# Patient Record
Sex: FEMALE | Race: ASIAN | NOT HISPANIC OR LATINO
[De-identification: names, ages, dates, MRNs, and addresses within clinical notes are randomized per-mention and may not be internally consistent; named-entity substitution may affect disease eponyms.]

---

## 2017-01-23 ENCOUNTER — NON-APPOINTMENT (OUTPATIENT)
Age: 57
End: 2017-01-23

## 2017-01-23 ENCOUNTER — APPOINTMENT (OUTPATIENT)
Dept: CARDIOLOGY | Facility: CLINIC | Age: 57
End: 2017-01-23

## 2017-01-23 VITALS — SYSTOLIC BLOOD PRESSURE: 108 MMHG | DIASTOLIC BLOOD PRESSURE: 66 MMHG

## 2017-01-23 VITALS
TEMPERATURE: 98 F | DIASTOLIC BLOOD PRESSURE: 71 MMHG | OXYGEN SATURATION: 98 % | HEART RATE: 77 BPM | SYSTOLIC BLOOD PRESSURE: 113 MMHG | BODY MASS INDEX: 21.87 KG/M2 | RESPIRATION RATE: 17 BRPM | WEIGHT: 112 LBS

## 2017-01-23 RX ORDER — ALBUTEROL SULFATE 2.5 MG/3ML
(2.5 MG/3ML) SOLUTION RESPIRATORY (INHALATION)
Qty: 150 | Refills: 0 | Status: ACTIVE | COMMUNITY
Start: 2017-01-12

## 2017-01-23 RX ORDER — ALBUTEROL SULFATE 90 UG/1
108 (90 BASE) AEROSOL, METERED RESPIRATORY (INHALATION)
Qty: 8 | Refills: 0 | Status: ACTIVE | COMMUNITY
Start: 2017-01-12

## 2018-01-29 ENCOUNTER — NON-APPOINTMENT (OUTPATIENT)
Age: 58
End: 2018-01-29

## 2018-01-29 ENCOUNTER — APPOINTMENT (OUTPATIENT)
Dept: CARDIOLOGY | Facility: CLINIC | Age: 58
End: 2018-01-29
Payer: COMMERCIAL

## 2018-01-29 VITALS
OXYGEN SATURATION: 98 % | DIASTOLIC BLOOD PRESSURE: 77 MMHG | BODY MASS INDEX: 22.85 KG/M2 | HEART RATE: 74 BPM | RESPIRATION RATE: 18 BRPM | SYSTOLIC BLOOD PRESSURE: 118 MMHG | TEMPERATURE: 98 F | WEIGHT: 117 LBS

## 2018-01-29 PROCEDURE — 93000 ELECTROCARDIOGRAM COMPLETE: CPT | Mod: 59

## 2018-01-29 PROCEDURE — 99214 OFFICE O/P EST MOD 30 MIN: CPT

## 2019-07-31 ENCOUNTER — NON-APPOINTMENT (OUTPATIENT)
Age: 59
End: 2019-07-31

## 2019-07-31 ENCOUNTER — APPOINTMENT (OUTPATIENT)
Dept: CARDIOLOGY | Facility: CLINIC | Age: 59
End: 2019-07-31
Payer: COMMERCIAL

## 2019-07-31 VITALS
SYSTOLIC BLOOD PRESSURE: 112 MMHG | RESPIRATION RATE: 18 BRPM | DIASTOLIC BLOOD PRESSURE: 73 MMHG | HEART RATE: 65 BPM | WEIGHT: 114 LBS | OXYGEN SATURATION: 98 % | BODY MASS INDEX: 22.26 KG/M2

## 2019-07-31 PROCEDURE — 93351 STRESS TTE COMPLETE: CPT

## 2019-07-31 PROCEDURE — 93325 DOPPLER ECHO COLOR FLOW MAPG: CPT

## 2019-07-31 PROCEDURE — 99214 OFFICE O/P EST MOD 30 MIN: CPT

## 2019-07-31 PROCEDURE — 93320 DOPPLER ECHO COMPLETE: CPT

## 2019-07-31 PROCEDURE — 93000 ELECTROCARDIOGRAM COMPLETE: CPT | Mod: 59

## 2019-07-31 NOTE — REASON FOR VISIT
[Follow-Up - Clinic] : a clinic follow-up of [Abnormal Test Result] : an abnormal test result [Chest Pain] : chest pain [Hyperlipidemia] : hyperlipidemia [Hypertension] : hypertension

## 2019-07-31 NOTE — DISCUSSION/SUMMARY
[Coronary Artery Catheterization] : coronary artery catheterization [Stent Placement] : stent placement [Dietary Modification] : dietary modification [Hyperlipidemia] : hyperlipidemia [Known CAD] : known coronary artery disease [Diet Modification] : diet modification [Exercise] : exercise [Hypertension] : hypertension [Stable] : stable [Responding to Treatment] : responding to treatment [None] : none [Exercise Regimen] : an exercise regimen [Sodium Restriction] : sodium restriction [Patient] : the patient [Risks] : risks [Benefits] : benefits [Alternatives] : alternatives [Minutes spent___] : for [unfilled] ~Uminutes [___ Month(s)] : [unfilled] month(s) [With Me] : with me [Stress Echocardiogram] : stress echocardiogram [de-identified] : s/p PCI, resolved, recurrent again in sequence [de-identified] : s/p PCI, asymptomatic. [de-identified] :  no ASA but clopeggrel dueto stomach issue. [de-identified] : PCP is moderating the  effect of anti-lipid therapy.

## 2019-07-31 NOTE — REVIEW OF SYSTEMS
[Fever] : no fever [Headache] : no headache [Chills] : no chills [Feeling Fatigued] : not feeling fatigued [Shortness Of Breath] : no shortness of breath [Chest  Pressure] : no chest pressure [Dyspnea on exertion] : not dyspnea during exertion [Chest Pain] : chest pain [Lower Ext Edema] : no extremity edema [Leg Claudication] : no intermittent leg claudication [Palpitations] : no palpitations [Cough] : no cough [Wheezing] : no wheezing [Coughing Up Blood] : no hemoptysis [Abdominal Pain] : no abdominal pain [Nausea] : no nausea [Vomiting] : no vomiting [Heartburn] : no heartburn [Change in Appetite] : no change in appetite [Change In The Stool] : no change in stool [Dysphagia] : no dysphagia [Joint Pain] : no joint pain [Joint Swelling] : no joint swelling [Joint Stiffness] : no joint stiffness [Muscle Cramps] : no muscle cramps [Limb Weakness (Paresis)] : no limb weakness [Skin: A Rash] : no rash: [Itching] : no itching [Change In Color Of Skin] : change in skin color [Skin Lesions] : no skin lesions [Dizziness] : no dizziness [Tremor] : no tremor was seen [Numbness (Hypesthesia)] : no numbness [Convulsions] : no convulsions [Tingling (Paresthesia)] : no tingling [Confusion] : no confusion was observed [Memory Lapses Or Loss] : no memory lapses or loss [Anxiety] : no anxiety [Depression] : no depression [Suicidal] : not suicidal [Under Stress] : not under stress [Easy Bleeding] : no tendency for easy bleeding [Excessive Thirst] : no polydipsia [Swollen Glands] : no swollen glands [Easy Bruising] : no tendency for easy bruising [Swollen Glands In The Neck] : no swollen glands in the neck [Negative] : Genitourinary

## 2019-07-31 NOTE — HISTORY OF PRESENT ILLNESS
[FreeTextEntry1] : On 9-, the patient has dobutamine stress  nuclear test, from which, she was found with  large area of anterior wall ischemia.\par On 9-, she tolerated the PCI  for  PLAD and MLAD. She is working now without symptoms.\par Since the completion of the PCI, she has been doing well without chest pain, shortness of breath, dizziness or palpitations until  last week with episode of chest pain while at walking, located at the right upper chest, but without shortness of breath or other symptoms.\par

## 2020-03-09 ENCOUNTER — NON-APPOINTMENT (OUTPATIENT)
Age: 60
End: 2020-03-09

## 2020-03-09 ENCOUNTER — APPOINTMENT (OUTPATIENT)
Dept: CARDIOLOGY | Facility: CLINIC | Age: 60
End: 2020-03-09
Payer: COMMERCIAL

## 2020-03-09 VITALS
RESPIRATION RATE: 18 BRPM | HEART RATE: 79 BPM | BODY MASS INDEX: 21.68 KG/M2 | SYSTOLIC BLOOD PRESSURE: 113 MMHG | DIASTOLIC BLOOD PRESSURE: 73 MMHG | WEIGHT: 111 LBS | TEMPERATURE: 98.3 F | OXYGEN SATURATION: 97 %

## 2020-03-09 PROCEDURE — 93000 ELECTROCARDIOGRAM COMPLETE: CPT | Mod: 59

## 2020-03-09 PROCEDURE — 99214 OFFICE O/P EST MOD 30 MIN: CPT

## 2020-03-09 NOTE — HISTORY OF PRESENT ILLNESS
[FreeTextEntry1] : On 9-, the patient has dobutamine stress  nuclear test, from which, she was found with  large area of anterior wall ischemia.\par On 9-, she tolerated the PCI  for  PLAD and MLAD. She is working now without symptoms.\par Since the completion of the PCI, she has been doing well without chest pain, shortness of breath, dizziness or palpitations. \par

## 2020-03-09 NOTE — DISCUSSION/SUMMARY
[Stress Echocardiogram] : stress echocardiogram [Coronary Artery Catheterization] : coronary artery catheterization [Stent Placement] : stent placement [Dietary Modification] : dietary modification [Hyperlipidemia] : hyperlipidemia [Known CAD] : known coronary artery disease [Diet Modification] : diet modification [Exercise] : exercise [Hypertension] : hypertension [Stable] : stable [Responding to Treatment] : responding to treatment [None] : none [Exercise Regimen] : an exercise regimen [Sodium Restriction] : sodium restriction [Patient] : the patient [Risks] : risks [Benefits] : benefits [Alternatives] : alternatives [Minutes spent___] : for [unfilled] ~Uminutes [___ Month(s)] : [unfilled] month(s) [With Me] : with me [Family] : the patient's family [de-identified] : s/p PCI, resolved, recurrent again in sequence [de-identified] : s/p PCI, asymptomatic. [de-identified] :  no ASA but clopeggrel dueto stomach issue. [de-identified] : PCP is moderating the  effect of anti-lipid therapy. The ideal LDL level shall be less than 70, Zetia may have to used.

## 2020-03-09 NOTE — PHYSICAL EXAM
[General Appearance - Well Developed] : well developed [Normal Appearance] : normal appearance [Well Groomed] : well groomed [General Appearance - Well Nourished] : well nourished [No Deformities] : no deformities [General Appearance - In No Acute Distress] : no acute distress [Normal Conjunctiva] : the conjunctiva exhibited no abnormalities [Eyelids - No Xanthelasma] : the eyelids demonstrated no xanthelasmas [Normal Oral Mucosa] : normal oral mucosa [No Oral Pallor] : no oral pallor [No Oral Cyanosis] : no oral cyanosis [Normal Jugular Venous A Waves Present] : normal jugular venous A waves present [Normal Jugular Venous V Waves Present] : normal jugular venous V waves present [No Jugular Venous Schaefer A Waves] : no jugular venous schaefer A waves [Respiration, Rhythm And Depth] : normal respiratory rhythm and effort [Exaggerated Use Of Accessory Muscles For Inspiration] : no accessory muscle use [Auscultation Breath Sounds / Voice Sounds] : lungs were clear to auscultation bilaterally [Chest Palpation] : palpation of the chest revealed no abnormalities [Lungs Percussion] : the lungs were normal to percussion [Heart Rate And Rhythm] : heart rate and rhythm were normal [Heart Sounds] : normal S1 and S2 [Murmurs] : no murmurs present [Arterial Pulses Normal] : the arterial pulses were normal [Edema] : no peripheral edema present [Veins - Varicosity Changes] : no varicosital changes were noted in the lower extremities [Bowel Sounds] : normal bowel sounds [Abdomen Soft] : soft [Abdomen Tenderness] : non-tender [Abdomen Mass (___ Cm)] : no abdominal mass palpated [Abdomen Hernia] : no hernia was discovered [Abnormal Walk] : normal gait [Gait - Sufficient For Exercise Testing] : the gait was sufficient for exercise testing [Nail Clubbing] : no clubbing of the fingernails [Cyanosis, Localized] : no localized cyanosis [Petechial Hemorrhages (___cm)] : no petechial hemorrhages [Skin Color & Pigmentation] : normal skin color and pigmentation [Skin Turgor] : normal skin turgor [] : no rash [No Venous Stasis] : no venous stasis [Skin Lesions] : no skin lesions [No Skin Ulcers] : no skin ulcer [No Xanthoma] : no  xanthoma was observed [Oriented To Time, Place, And Person] : oriented to person, place, and time [Affect] : the affect was normal [Mood] : the mood was normal [Memory Recent] : recent memory was not impaired [Memory Remote] : remote memory was not impaired [No Anxiety] : not feeling anxious

## 2020-09-14 ENCOUNTER — APPOINTMENT (OUTPATIENT)
Dept: CARDIOLOGY | Facility: CLINIC | Age: 60
End: 2020-09-14
Payer: COMMERCIAL

## 2020-09-14 ENCOUNTER — NON-APPOINTMENT (OUTPATIENT)
Age: 60
End: 2020-09-14

## 2020-09-14 VITALS
WEIGHT: 115 LBS | DIASTOLIC BLOOD PRESSURE: 75 MMHG | RESPIRATION RATE: 18 BRPM | OXYGEN SATURATION: 96 % | BODY MASS INDEX: 22.46 KG/M2 | HEART RATE: 75 BPM | TEMPERATURE: 97.2 F | SYSTOLIC BLOOD PRESSURE: 115 MMHG

## 2020-09-14 PROCEDURE — 99214 OFFICE O/P EST MOD 30 MIN: CPT

## 2020-09-14 PROCEDURE — 93000 ELECTROCARDIOGRAM COMPLETE: CPT | Mod: 59

## 2020-09-14 NOTE — DISCUSSION/SUMMARY
[Stress Echocardiogram] : stress echocardiogram [Coronary Artery Catheterization] : coronary artery catheterization [Dietary Modification] : dietary modification [Stent Placement] : stent placement [Hyperlipidemia] : hyperlipidemia [Known CAD] : known coronary artery disease [Diet Modification] : diet modification [Exercise] : exercise [Hypertension] : hypertension [Family] : the patient's family [Stable] : stable [None] : none [Exercise Regimen] : an exercise regimen [Responding to Treatment] : responding to treatment [Sodium Restriction] : sodium restriction [Patient] : the patient [Risks] : risks [Benefits] : benefits [Alternatives] : alternatives [Minutes spent___] : for [unfilled] ~Uminutes [___ Month(s)] : [unfilled] month(s) [With Me] : with me [de-identified] : s/p PCI, resolved, recurrent again in sequence [de-identified] : s/p PCI, asymptomatic. [de-identified] :  no ASA but clopeggrel due to stomach issue. No beta-blocker because of asthmatic episode. [de-identified] : PCP is moderating the  effect of anti-lipid therapy. The ideal LDL level shall be less than 70, Zetia may have to used.

## 2020-09-14 NOTE — REVIEW OF SYSTEMS
[Negative] : Endocrine [Fever] : no fever [Headache] : no headache [Chills] : no chills [Feeling Fatigued] : not feeling fatigued [Shortness Of Breath] : no shortness of breath [Chest  Pressure] : no chest pressure [Dyspnea on exertion] : not dyspnea during exertion [Chest Pain] : no chest pain [Leg Claudication] : no intermittent leg claudication [Lower Ext Edema] : no extremity edema [Wheezing] : no wheezing [Cough] : no cough [Palpitations] : no palpitations [Nausea] : no nausea [Abdominal Pain] : no abdominal pain [Coughing Up Blood] : no hemoptysis [Vomiting] : no vomiting [Change in Appetite] : no change in appetite [Heartburn] : no heartburn [Joint Pain] : no joint pain [Dysphagia] : no dysphagia [Change In The Stool] : no change in stool [Joint Swelling] : no joint swelling [Joint Stiffness] : no joint stiffness [Limb Weakness (Paresis)] : no limb weakness [Muscle Cramps] : no muscle cramps [Itching] : no itching [Change In Color Of Skin] : change in skin color [Skin: A Rash] : no rash: [Skin Lesions] : no skin lesions [Tremor] : no tremor was seen [Dizziness] : no dizziness [Numbness (Hypesthesia)] : no numbness [Convulsions] : no convulsions [Tingling (Paresthesia)] : no tingling [Confusion] : no confusion was observed [Memory Lapses Or Loss] : no memory lapses or loss [Depression] : no depression [Anxiety] : no anxiety [Under Stress] : not under stress [Suicidal] : not suicidal [Excessive Thirst] : no polydipsia [Easy Bleeding] : no tendency for easy bleeding [Easy Bruising] : no tendency for easy bruising [Swollen Glands] : no swollen glands [Swollen Glands In The Neck] : no swollen glands in the neck

## 2020-09-14 NOTE — PHYSICAL EXAM
[General Appearance - Well Developed] : well developed [Normal Appearance] : normal appearance [Well Groomed] : well groomed [General Appearance - Well Nourished] : well nourished [No Deformities] : no deformities [General Appearance - In No Acute Distress] : no acute distress [Normal Conjunctiva] : the conjunctiva exhibited no abnormalities [Eyelids - No Xanthelasma] : the eyelids demonstrated no xanthelasmas [Normal Oral Mucosa] : normal oral mucosa [No Oral Pallor] : no oral pallor [No Oral Cyanosis] : no oral cyanosis [Normal Jugular Venous A Waves Present] : normal jugular venous A waves present [Normal Jugular Venous V Waves Present] : normal jugular venous V waves present [No Jugular Venous Schaefer A Waves] : no jugular venous schaefer A waves [Respiration, Rhythm And Depth] : normal respiratory rhythm and effort [Exaggerated Use Of Accessory Muscles For Inspiration] : no accessory muscle use [Auscultation Breath Sounds / Voice Sounds] : lungs were clear to auscultation bilaterally [Chest Palpation] : palpation of the chest revealed no abnormalities [Lungs Percussion] : the lungs were normal to percussion [Heart Rate And Rhythm] : heart rate and rhythm were normal [Murmurs] : no murmurs present [Heart Sounds] : normal S1 and S2 [Arterial Pulses Normal] : the arterial pulses were normal [Veins - Varicosity Changes] : no varicosital changes were noted in the lower extremities [Edema] : no peripheral edema present [Bowel Sounds] : normal bowel sounds [Abdomen Soft] : soft [Abdomen Tenderness] : non-tender [Abdomen Mass (___ Cm)] : no abdominal mass palpated [Abdomen Hernia] : no hernia was discovered [Abnormal Walk] : normal gait [Gait - Sufficient For Exercise Testing] : the gait was sufficient for exercise testing [Cyanosis, Localized] : no localized cyanosis [Nail Clubbing] : no clubbing of the fingernails [Petechial Hemorrhages (___cm)] : no petechial hemorrhages [] : no rash [Skin Turgor] : normal skin turgor [No Venous Stasis] : no venous stasis [Skin Lesions] : no skin lesions [No Skin Ulcers] : no skin ulcer [No Xanthoma] : no  xanthoma was observed [Oriented To Time, Place, And Person] : oriented to person, place, and time [Mood] : the mood was normal [Affect] : the affect was normal [No Anxiety] : not feeling anxious

## 2020-09-14 NOTE — HISTORY OF PRESENT ILLNESS
[FreeTextEntry1] : On 9-, the patient has dobutamine stress  nuclear test, from which, she was found with  large area of anterior wall ischemia.\par On 9-, she tolerated the PCI  for  PLAD and MLAD. She is working now without symptoms.\par Since the completion of the PCI, she has been doing well without chest pain, shortness of breath, dizziness or palpitations. \par During  the COVID pandemic, she is sedentary but without manifestation or event.\par

## 2021-01-13 ENCOUNTER — APPOINTMENT (OUTPATIENT)
Dept: CARDIOLOGY | Facility: CLINIC | Age: 61
End: 2021-01-13
Payer: COMMERCIAL

## 2021-01-13 ENCOUNTER — NON-APPOINTMENT (OUTPATIENT)
Age: 61
End: 2021-01-13

## 2021-01-13 VITALS
HEART RATE: 70 BPM | RESPIRATION RATE: 18 BRPM | WEIGHT: 112 LBS | TEMPERATURE: 97.8 F | OXYGEN SATURATION: 100 % | SYSTOLIC BLOOD PRESSURE: 148 MMHG | BODY MASS INDEX: 21.87 KG/M2 | DIASTOLIC BLOOD PRESSURE: 85 MMHG

## 2021-01-13 DIAGNOSIS — K21.9 GASTRO-ESOPHAGEAL REFLUX DISEASE W/OUT ESOPHAGITIS: ICD-10-CM

## 2021-01-13 PROCEDURE — 93000 ELECTROCARDIOGRAM COMPLETE: CPT | Mod: 59

## 2021-01-13 PROCEDURE — 99072 ADDL SUPL MATRL&STAF TM PHE: CPT

## 2021-01-13 PROCEDURE — 99214 OFFICE O/P EST MOD 30 MIN: CPT

## 2021-01-13 NOTE — DISCUSSION/SUMMARY
[Stress Echocardiogram] : stress echocardiogram [Coronary Artery Catheterization] : coronary artery catheterization [Stent Placement] : stent placement [Dietary Modification] : dietary modification [Hyperlipidemia] : hyperlipidemia [Known CAD] : known coronary artery disease [Diet Modification] : diet modification [Exercise] : exercise [Family] : the patient's family [Hypertension] : hypertension [Stable] : stable [Responding to Treatment] : responding to treatment [None] : none [Exercise Regimen] : an exercise regimen [Sodium Restriction] : sodium restriction [Patient] : the patient [Risks] : risks [Benefits] : benefits [Alternatives] : alternatives [Minutes spent___] : for [unfilled] ~Uminutes [___ Month(s)] : [unfilled] month(s) [With Me] : with me [de-identified] : s/p PCI, resolved, recurrent again in sequence [de-identified] : s/p PCI, asymptomatic. [de-identified] :  no ASA but clopeggrel due to stomach issue. No beta-blocker because of asthmatic episode. [de-identified] : PCP is moderating the  effect of anti-lipid therapy. The ideal LDL level shall be less than 70, Zetia may have to used. [FreeTextEntry1] : The patient's reaction of vaccination is transient. It is assured.

## 2021-01-13 NOTE — HISTORY OF PRESENT ILLNESS
[FreeTextEntry1] : On 9-, the patient has dobutamine stress  nuclear test, from which, she was found with  large area of anterior wall ischemia.\par On 9-, she tolerated the PCI  for  PLAD and MLAD. She is working now without symptoms.\par Since the completion of the PCI, she has been doing well without chest pain, shortness of breath, dizziness or palpitations. \par During  the COVID pandemic, she is sedentary but without manifestation or event.\par She suffered from  hypertensive reaction, dizziness after the COVID vaccination on 1-2-2021.\par

## 2021-01-13 NOTE — REASON FOR VISIT
[Follow-Up - Clinic] : a clinic follow-up of [Abnormal Test Result] : an abnormal test result [Chest Pain] : chest pain [Hyperlipidemia] : hyperlipidemia [Hypertension] : hypertension [FreeTextEntry1] : hypertensive responses after COVID vaccination.

## 2021-01-13 NOTE — PHYSICAL EXAM
[General Appearance - Well Developed] : well developed [Normal Appearance] : normal appearance [Well Groomed] : well groomed [General Appearance - Well Nourished] : well nourished [No Deformities] : no deformities [General Appearance - In No Acute Distress] : no acute distress [Normal Conjunctiva] : the conjunctiva exhibited no abnormalities [Eyelids - No Xanthelasma] : the eyelids demonstrated no xanthelasmas [Normal Oral Mucosa] : normal oral mucosa [No Oral Pallor] : no oral pallor [No Oral Cyanosis] : no oral cyanosis [Normal Jugular Venous A Waves Present] : normal jugular venous A waves present [Normal Jugular Venous V Waves Present] : normal jugular venous V waves present [No Jugular Venous Schaefer A Waves] : no jugular venous schaefer A waves [Respiration, Rhythm And Depth] : normal respiratory rhythm and effort [Exaggerated Use Of Accessory Muscles For Inspiration] : no accessory muscle use [Auscultation Breath Sounds / Voice Sounds] : lungs were clear to auscultation bilaterally [Chest Palpation] : palpation of the chest revealed no abnormalities [Lungs Percussion] : the lungs were normal to percussion [Heart Rate And Rhythm] : heart rate and rhythm were normal [Heart Sounds] : normal S1 and S2 [Murmurs] : no murmurs present [Arterial Pulses Normal] : the arterial pulses were normal [Edema] : no peripheral edema present [Veins - Varicosity Changes] : no varicosital changes were noted in the lower extremities [Bowel Sounds] : normal bowel sounds [Abdomen Soft] : soft [Abdomen Tenderness] : non-tender [Abdomen Mass (___ Cm)] : no abdominal mass palpated [Abdomen Hernia] : no hernia was discovered [Abnormal Walk] : normal gait [Gait - Sufficient For Exercise Testing] : the gait was sufficient for exercise testing [Nail Clubbing] : no clubbing of the fingernails [Cyanosis, Localized] : no localized cyanosis [Petechial Hemorrhages (___cm)] : no petechial hemorrhages [Skin Turgor] : normal skin turgor [] : no rash [No Venous Stasis] : no venous stasis [Skin Lesions] : no skin lesions [No Skin Ulcers] : no skin ulcer [No Xanthoma] : no  xanthoma was observed [Oriented To Time, Place, And Person] : oriented to person, place, and time [Affect] : the affect was normal [Mood] : the mood was normal [No Anxiety] : not feeling anxious

## 2021-03-15 ENCOUNTER — APPOINTMENT (OUTPATIENT)
Dept: CARDIOLOGY | Facility: CLINIC | Age: 61
End: 2021-03-15

## 2021-07-09 ENCOUNTER — RX RENEWAL (OUTPATIENT)
Age: 61
End: 2021-07-09

## 2021-07-12 ENCOUNTER — RESULT CHARGE (OUTPATIENT)
Age: 61
End: 2021-07-12

## 2021-07-14 ENCOUNTER — APPOINTMENT (OUTPATIENT)
Dept: CARDIOLOGY | Facility: CLINIC | Age: 61
End: 2021-07-14
Payer: COMMERCIAL

## 2021-07-14 ENCOUNTER — NON-APPOINTMENT (OUTPATIENT)
Age: 61
End: 2021-07-14

## 2021-07-14 VITALS
WEIGHT: 113 LBS | RESPIRATION RATE: 18 BRPM | BODY MASS INDEX: 22.07 KG/M2 | HEART RATE: 67 BPM | SYSTOLIC BLOOD PRESSURE: 126 MMHG | TEMPERATURE: 97.1 F | DIASTOLIC BLOOD PRESSURE: 78 MMHG | OXYGEN SATURATION: 98 %

## 2021-07-14 PROCEDURE — 99072 ADDL SUPL MATRL&STAF TM PHE: CPT

## 2021-07-14 PROCEDURE — 99214 OFFICE O/P EST MOD 30 MIN: CPT

## 2021-07-14 PROCEDURE — 93000 ELECTROCARDIOGRAM COMPLETE: CPT | Mod: 59

## 2021-07-14 RX ORDER — FAMOTIDINE 40 MG/1
40 TABLET, FILM COATED ORAL
Qty: 90 | Refills: 0 | Status: DISCONTINUED | COMMUNITY
Start: 2021-01-13 | End: 2021-07-14

## 2021-07-14 NOTE — REVIEW OF SYSTEMS
[SOB] : no shortness of breath [Dyspnea on exertion] : not dyspnea during exertion [Chest Discomfort] : no chest discomfort [Lower Ext Edema] : no extremity edema [Leg Claudication] : no intermittent leg claudication [Palpitations] : palpitations [Orthopnea] : no orthopnea [PND] : no PND [Syncope] : no syncope [Negative] : Heme/Lymph [FreeTextEntry5] : yesterday with short stay palpitations.

## 2021-07-14 NOTE — DISCUSSION/SUMMARY
[Coronary Artery Catheterization] : coronary artery catheterization [Stent Placement] : stent placement [Hyperlipidemia] : hyperlipidemia [Known CAD] : known coronary artery disease [Diet Modification] : diet modification [Exercise] : exercise [Hypertension] : hypertension [Stable] : stable [Risks] : risks [Benefits] : benefits [Alternatives] : alternatives [Minutes Spent: ___] : for [unfilled] ~Uminutes [With Me] : with me [___ Month(s)] : in [unfilled] month(s) [Coronary Artery Disease] : coronary artery disease [Responding to Treatment] : responding to treatment [Exercise Regimen] : an exercise regimen [Patient] : discussed with the patient [None] : There are no changes in medication management [Dietary Modification] : dietary modification [Acetaminophen Avoidance] : acetaminophen  avoidance [Low Sodium Diet] : low sodium diet [NSAIDs Avoidance] : non-steroidal anti-inflammatory drugs avoidance [de-identified] : resolved, no recurrence [de-identified] : s/p stent after the positive nuclear testing. [de-identified] : PMD is moderating the result of  treatment. [FreeTextEntry1] : The patient's reaction of vaccination is transient. It was assured.\par Palpitation only once a while, and short stay without symptoms. It is discussed and keep without medication but avoid  coffee, etc,.

## 2021-07-14 NOTE — HISTORY OF PRESENT ILLNESS
[FreeTextEntry1] : On 9-, the patient has dobutamine stress  nuclear test, from which, she was found with  large area of anterior wall ischemia.\par On 9-, she tolerated the PCI  for  PLAD and MLAD. She is working now without symptoms.\par Since the completion of the PCI, she has been doing well without chest pain, shortness of breath, dizziness or palpitations. \par During  the COVID pandemic, she is sedentary but without manifestation or event. She has completed 2X Pfizer COVID vaccinations.\par She suffered from  hypertensive reaction, dizziness after the COVID vaccination on 1-2-2021.\par She has been well with current medications. She has intolerance to aspirin for GI.\par

## 2021-07-14 NOTE — REASON FOR VISIT
[Follow-Up - Clinic] : a clinic follow-up of [Chest Pain] : chest pain [Hyperlipidemia] : hyperlipidemia [Hypertension] : hypertension [Coronary Artery Disease] : coronary artery disease [FreeTextEntry1] : hypertensive responses after COVID vaccination.

## 2022-01-19 ENCOUNTER — NON-APPOINTMENT (OUTPATIENT)
Age: 62
End: 2022-01-19

## 2022-01-19 ENCOUNTER — APPOINTMENT (OUTPATIENT)
Dept: CARDIOLOGY | Facility: CLINIC | Age: 62
End: 2022-01-19
Payer: COMMERCIAL

## 2022-01-19 VITALS
BODY MASS INDEX: 22.07 KG/M2 | WEIGHT: 113 LBS | TEMPERATURE: 97 F | DIASTOLIC BLOOD PRESSURE: 77 MMHG | OXYGEN SATURATION: 96 % | SYSTOLIC BLOOD PRESSURE: 117 MMHG | RESPIRATION RATE: 18 BRPM | HEART RATE: 74 BPM

## 2022-01-19 PROCEDURE — 93000 ELECTROCARDIOGRAM COMPLETE: CPT | Mod: 59

## 2022-01-19 PROCEDURE — 99214 OFFICE O/P EST MOD 30 MIN: CPT

## 2022-01-19 NOTE — PHYSICAL EXAM
[General Appearance - Well Developed] : well developed [Normal Appearance] : normal appearance [Well Groomed] : well groomed [General Appearance - Well Nourished] : well nourished [No Deformities] : no deformities [General Appearance - In No Acute Distress] : no acute distress [Normal Conjunctiva] : the conjunctiva exhibited no abnormalities [Eyelids - No Xanthelasma] : the eyelids demonstrated no xanthelasmas [Normal Oral Mucosa] : normal oral mucosa [No Oral Pallor] : no oral pallor [No Oral Cyanosis] : no oral cyanosis [Normal Jugular Venous A Waves Present] : normal jugular venous A waves present [Normal Jugular Venous V Waves Present] : normal jugular venous V waves present [No Jugular Venous Schaefer A Waves] : no jugular venous schaefer A waves [Respiration, Rhythm And Depth] : normal respiratory rhythm and effort [Exaggerated Use Of Accessory Muscles For Inspiration] : no accessory muscle use [Auscultation Breath Sounds / Voice Sounds] : lungs were clear to auscultation bilaterally [Chest Palpation] : palpation of the chest revealed no abnormalities [Lungs Percussion] : the lungs were normal to percussion [Heart Rate And Rhythm] : heart rate and rhythm were normal [Heart Sounds] : normal S1 and S2 [Murmurs] : no murmurs present [Arterial Pulses Normal] : the arterial pulses were normal [Edema] : no peripheral edema present [Veins - Varicosity Changes] : no varicosital changes were noted in the lower extremities [Bowel Sounds] : normal bowel sounds [Abdomen Soft] : soft [Abdomen Tenderness] : non-tender [Abdomen Mass (___ Cm)] : no abdominal mass palpated [Abdomen Hernia] : no hernia was discovered [Abnormal Walk] : normal gait [Gait - Sufficient For Exercise Testing] : the gait was sufficient for exercise testing [Nail Clubbing] : no clubbing of the fingernails [Cyanosis, Localized] : no localized cyanosis [Petechial Hemorrhages (___cm)] : no petechial hemorrhages [Skin Turgor] : normal skin turgor [] : no rash [No Venous Stasis] : no venous stasis [Skin Lesions] : no skin lesions [No Skin Ulcers] : no skin ulcer [No Xanthoma] : no  xanthoma was observed [Oriented To Time, Place, And Person] : oriented to person, place, and time [Affect] : the affect was normal [Mood] : the mood was normal [No Anxiety] : not feeling anxious [Normal Radial B/L] : normal radial B/L [Normal PT B/L] : normal PT B/L [Normal DP B/L] : normal DP B/L

## 2022-01-19 NOTE — REVIEW OF SYSTEMS
[Negative] : Heme/Lymph [Fever] : no fever [Headache] : no headache [Chills] : no chills [Feeling Fatigued] : not feeling fatigued [Blurry Vision] : no blurred vision [Seeing Double (Diplopia)] : no diplopia [Eye Pain] : no eye pain [Earache] : no earache [Discharge From Ears] : no discharge from the ears [Hearing Loss] : no hearing loss [Mouth Sores] : no mouth sores [Sore Throat] : no sore throat [Sinus Pressure] : no sinus pressure [Tinnitus] : no tinnitus [Vertigo] : no vertigo [SOB] : no shortness of breath [Dyspnea on exertion] : not dyspnea during exertion [Chest Discomfort] : no chest discomfort [Lower Ext Edema] : no extremity edema [Leg Claudication] : no intermittent leg claudication [Palpitations] : no palpitations [Orthopnea] : no orthopnea [PND] : no PND [Syncope] : no syncope [Cough] : no cough [Wheezing] : no wheezing [Coughing Up Blood] : no hemoptysis [Snoring] : no snoring [Abdominal Pain] : no abdominal pain [Nausea] : no nausea [Vomiting] : no vomiting [Heartburn] : no heartburn [Change in Appetite] : no change in appetite [Change In The Stool] : no change in stool [Dysphagia] : no dysphagia [Diarrhea] : diarrhea [Constipation] : no constipation [Blood in Stool] : no blood in stool [Dysuria] : no dysuria [Pelvic Pain] : no pelvic pain [Abn Vaginal Bleeding] : no unexplained vaginal bleeding [Joint Pain] : no joint pain [Joint Swelling] : no joint swelling [Joint Stiffness] : no joint stiffness [Muscle Cramps] : no muscle cramps [Myalgia] : no myalgia [Rash] : no rash [Itching] : no itching [Change In Color Of Skin] : change in skin color [Skin Lesions] : no skin lesions [Telangiectasias] : no telangiectasias [Dizziness] : no dizziness [Tremor] : no tremor was seen [Numbness (Hypoesthesia)] : no numbness [Convulsions] : no convulsions [Tingling (Paresthesia)] : no tingling [Weakness] : no weakness [Limb Weakness (Paresis)] : no limb weakness (Paresis) [Speech Disturbance] : no speech disturbance [Confusion] : no confusion was observed [Memory Lapses Or Loss] : no memory lapses or loss [Depression] : no depression [Anxiety] : no anxiety [Under Stress] : not under stress [Suicidal] : not suicidal [Easy Bleeding] : no tendency for easy bleeding [Swollen Glands] : no swollen glands [Easy Bruising] : no tendency for easy bruising [FreeTextEntry5] : yesterday with short stay palpitations.

## 2022-01-19 NOTE — REASON FOR VISIT
[Follow-Up - Clinic] : a clinic follow-up of [Chest Pain] : chest pain [Coronary Artery Disease] : coronary artery disease [Hyperlipidemia] : hyperlipidemia [Hypertension] : hypertension [FreeTextEntry1] : hypertensive responses after COVID vaccination.

## 2022-01-19 NOTE — DISCUSSION/SUMMARY
[Coronary Artery Disease] : coronary artery disease [Coronary Artery Catheterization] : coronary artery catheterization [Stent Placement] : stent placement [Hyperlipidemia] : hyperlipidemia [Known CAD] : known coronary artery disease [Diet Modification] : diet modification [Exercise] : exercise [Hypertension] : hypertension [Stable] : stable [Responding to Treatment] : responding to treatment [None] : There are no changes in medication management [Exercise Regimen] : an exercise regimen [Dietary Modification] : dietary modification [Acetaminophen Avoidance] : acetaminophen  avoidance [Low Sodium Diet] : low sodium diet [NSAIDs Avoidance] : non-steroidal anti-inflammatory drugs avoidance [Patient] : the patient [Risks] : risks [Benefits] : benefits [Alternatives] : alternatives [Minutes Spent: ___] : for [unfilled] ~Uminutes [With Me] : with me [___ Month(s)] : in [unfilled] month(s) [de-identified] : resolved, no recurrence [de-identified] : s/p stent after the positive nuclear testing. [de-identified] : PMD is moderating the result of  treatment. [FreeTextEntry1] : The patient's reaction of vaccination is transient. It was assured.\par Palpitation only once a while, and short stay without symptoms. It is discussed and keep without medication but avoid  coffee, etc,.

## 2022-07-18 ENCOUNTER — NON-APPOINTMENT (OUTPATIENT)
Age: 62
End: 2022-07-18

## 2022-07-18 ENCOUNTER — APPOINTMENT (OUTPATIENT)
Dept: CARDIOLOGY | Facility: CLINIC | Age: 62
End: 2022-07-18

## 2022-07-18 VITALS
SYSTOLIC BLOOD PRESSURE: 142 MMHG | DIASTOLIC BLOOD PRESSURE: 83 MMHG | TEMPERATURE: 97.8 F | OXYGEN SATURATION: 99 % | RESPIRATION RATE: 17 BRPM | HEART RATE: 69 BPM | WEIGHT: 107 LBS | BODY MASS INDEX: 20.9 KG/M2

## 2022-07-18 VITALS — DIASTOLIC BLOOD PRESSURE: 72 MMHG | SYSTOLIC BLOOD PRESSURE: 124 MMHG

## 2022-07-18 PROCEDURE — 93000 ELECTROCARDIOGRAM COMPLETE: CPT | Mod: 59

## 2022-07-18 PROCEDURE — 99214 OFFICE O/P EST MOD 30 MIN: CPT

## 2022-07-18 RX ORDER — TACROLIMUS 1 MG/G
0.1 OINTMENT TOPICAL
Qty: 30 | Refills: 0 | Status: ACTIVE | COMMUNITY
Start: 2022-03-30

## 2022-07-18 RX ORDER — POLYETHYLENE GLYCOL 3350 AND ELECTROLYTES WITH LEMON FLAVOR 236; 22.74; 6.74; 5.86; 2.97 G/4L; G/4L; G/4L; G/4L; G/4L
236 POWDER, FOR SOLUTION ORAL
Qty: 4000 | Refills: 0 | Status: ACTIVE | COMMUNITY
Start: 2022-03-30

## 2022-07-18 RX ORDER — FLUOROMETHOLONE 1 MG/ML
0.1 SOLUTION/ DROPS OPHTHALMIC
Qty: 5 | Refills: 0 | Status: ACTIVE | COMMUNITY
Start: 2022-06-02

## 2022-07-18 NOTE — PHYSICAL EXAM
[Normal Radial B/L] : normal radial B/L [Normal PT B/L] : normal PT B/L [Normal DP B/L] : normal DP B/L [General Appearance - Well Developed] : well developed [Normal Appearance] : normal appearance [Well Groomed] : well groomed [General Appearance - Well Nourished] : well nourished [No Deformities] : no deformities [General Appearance - In No Acute Distress] : no acute distress [Normal Conjunctiva] : the conjunctiva exhibited no abnormalities [Eyelids - No Xanthelasma] : the eyelids demonstrated no xanthelasmas [Normal Oral Mucosa] : normal oral mucosa [No Oral Pallor] : no oral pallor [No Oral Cyanosis] : no oral cyanosis [Normal Jugular Venous A Waves Present] : normal jugular venous A waves present [Normal Jugular Venous V Waves Present] : normal jugular venous V waves present [No Jugular Venous Schaefer A Waves] : no jugular venous schaefer A waves [Respiration, Rhythm And Depth] : normal respiratory rhythm and effort [Exaggerated Use Of Accessory Muscles For Inspiration] : no accessory muscle use [Auscultation Breath Sounds / Voice Sounds] : lungs were clear to auscultation bilaterally [Chest Palpation] : palpation of the chest revealed no abnormalities [Lungs Percussion] : the lungs were normal to percussion [Heart Rate And Rhythm] : heart rate and rhythm were normal [Heart Sounds] : normal S1 and S2 [Murmurs] : no murmurs present [Arterial Pulses Normal] : the arterial pulses were normal [Edema] : no peripheral edema present [Veins - Varicosity Changes] : no varicosital changes were noted in the lower extremities [Bowel Sounds] : normal bowel sounds [Abdomen Soft] : soft [Abdomen Tenderness] : non-tender [Abdomen Mass (___ Cm)] : no abdominal mass palpated [Abdomen Hernia] : no hernia was discovered [Abnormal Walk] : normal gait [Gait - Sufficient For Exercise Testing] : the gait was sufficient for exercise testing [Nail Clubbing] : no clubbing of the fingernails [Cyanosis, Localized] : no localized cyanosis [Petechial Hemorrhages (___cm)] : no petechial hemorrhages [Skin Turgor] : normal skin turgor [] : no rash [No Venous Stasis] : no venous stasis [Skin Lesions] : no skin lesions [No Skin Ulcers] : no skin ulcer [No Xanthoma] : no  xanthoma was observed [Oriented To Time, Place, And Person] : oriented to person, place, and time [Affect] : the affect was normal [Mood] : the mood was normal [No Anxiety] : not feeling anxious

## 2022-07-18 NOTE — DISCUSSION/SUMMARY
[Coronary Artery Disease] : coronary artery disease [Coronary Artery Catheterization] : coronary artery catheterization [Stent Placement] : stent placement [Hyperlipidemia] : hyperlipidemia [Known CAD] : known coronary artery disease [Diet Modification] : diet modification [Exercise] : exercise [Hypertension] : hypertension [Stable] : stable [Responding to Treatment] : responding to treatment [None] : There are no changes in medication management [Exercise Regimen] : an exercise regimen [Dietary Modification] : dietary modification [Acetaminophen Avoidance] : acetaminophen  avoidance [Low Sodium Diet] : low sodium diet [NSAIDs Avoidance] : non-steroidal anti-inflammatory drugs avoidance [Patient] : the patient [Risks] : risks [Benefits] : benefits [Alternatives] : alternatives [Minutes Spent: ___] : for [unfilled] ~Uminutes [With Me] : with me [___ Month(s)] : in [unfilled] month(s) [de-identified] : resolved, no recurrence [de-identified] : s/p stent after the positive nuclear testing. [de-identified] : PMD is moderating the result of  treatment. [FreeTextEntry1] : The patient's reaction of vaccination is transient. It was assured.\par Palpitation only once a while, and short stay without symptoms. It is discussed and keep without medication but avoid  coffee, etc,.

## 2023-01-18 ENCOUNTER — APPOINTMENT (OUTPATIENT)
Dept: CARDIOLOGY | Facility: CLINIC | Age: 63
End: 2023-01-18
Payer: COMMERCIAL

## 2023-01-18 ENCOUNTER — NON-APPOINTMENT (OUTPATIENT)
Age: 63
End: 2023-01-18

## 2023-01-18 VITALS
RESPIRATION RATE: 18 BRPM | BODY MASS INDEX: 20.9 KG/M2 | DIASTOLIC BLOOD PRESSURE: 75 MMHG | OXYGEN SATURATION: 98 % | HEART RATE: 84 BPM | SYSTOLIC BLOOD PRESSURE: 109 MMHG | WEIGHT: 107 LBS | TEMPERATURE: 97.6 F

## 2023-01-18 DIAGNOSIS — R94.39 ABNORMAL RESULT OF OTHER CARDIOVASCULAR FUNCTION STUDY: ICD-10-CM

## 2023-01-18 PROCEDURE — 93000 ELECTROCARDIOGRAM COMPLETE: CPT | Mod: 59

## 2023-01-18 PROCEDURE — 99214 OFFICE O/P EST MOD 30 MIN: CPT

## 2023-01-18 NOTE — DISCUSSION/SUMMARY
[Coronary Artery Disease] : coronary artery disease [Coronary Artery Catheterization] : coronary artery catheterization [Stent Placement] : stent placement [Hyperlipidemia] : hyperlipidemia [Known CAD] : known coronary artery disease [Diet Modification] : diet modification [Exercise] : exercise [Hypertension] : hypertension [Stable] : stable [Responding to Treatment] : responding to treatment [None] : There are no changes in medication management [Exercise Regimen] : an exercise regimen [Dietary Modification] : dietary modification [Acetaminophen Avoidance] : acetaminophen  avoidance [Low Sodium Diet] : low sodium diet [NSAIDs Avoidance] : non-steroidal anti-inflammatory drugs avoidance [Patient] : the patient [Risks] : risks [Benefits] : benefits [Alternatives] : alternatives [Minutes Spent: ___] : for [unfilled] ~Uminutes [With Me] : with me [___ Month(s)] : in [unfilled] month(s) [de-identified] : resolved, no recurrence [de-identified] : s/p stent after the positive nuclear testing. [de-identified] : PMD is moderating the result of  treatment. [FreeTextEntry1] : The patient's reaction of vaccination is transient. It was assured.\par

## 2023-01-18 NOTE — REASON FOR VISIT
[Follow-Up - Clinic] : a clinic follow-up of [Chest Pain] : chest pain [Hyperlipidemia] : hyperlipidemia [Hypertension] : hypertension [Symptom and Test Evaluation] : symptom and test evaluation [Coronary Artery Disease] : coronary artery disease

## 2023-08-04 ENCOUNTER — APPOINTMENT (OUTPATIENT)
Dept: CARDIOLOGY | Facility: CLINIC | Age: 63
End: 2023-08-04

## 2023-09-15 ENCOUNTER — NON-APPOINTMENT (OUTPATIENT)
Age: 63
End: 2023-09-15

## 2023-09-15 ENCOUNTER — APPOINTMENT (OUTPATIENT)
Dept: CARDIOLOGY | Facility: CLINIC | Age: 63
End: 2023-09-15
Payer: COMMERCIAL

## 2023-09-15 VITALS
DIASTOLIC BLOOD PRESSURE: 80 MMHG | OXYGEN SATURATION: 96 % | HEIGHT: 66 IN | RESPIRATION RATE: 18 BRPM | SYSTOLIC BLOOD PRESSURE: 116 MMHG | HEART RATE: 82 BPM | TEMPERATURE: 97.2 F | WEIGHT: 120 LBS | BODY MASS INDEX: 19.29 KG/M2

## 2023-09-15 PROCEDURE — 99214 OFFICE O/P EST MOD 30 MIN: CPT

## 2023-09-15 PROCEDURE — 93000 ELECTROCARDIOGRAM COMPLETE: CPT

## 2023-10-06 ENCOUNTER — APPOINTMENT (OUTPATIENT)
Dept: CARDIOLOGY | Facility: CLINIC | Age: 63
End: 2023-10-06
Payer: COMMERCIAL

## 2023-10-06 VITALS
SYSTOLIC BLOOD PRESSURE: 137 MMHG | RESPIRATION RATE: 18 BRPM | DIASTOLIC BLOOD PRESSURE: 84 MMHG | HEART RATE: 73 BPM | WEIGHT: 113 LBS | OXYGEN SATURATION: 99 % | TEMPERATURE: 98 F | BODY MASS INDEX: 18.24 KG/M2

## 2023-10-06 PROCEDURE — 99214 OFFICE O/P EST MOD 30 MIN: CPT

## 2023-10-10 ENCOUNTER — APPOINTMENT (OUTPATIENT)
Dept: CARDIOLOGY | Facility: CLINIC | Age: 63
End: 2023-10-10
Payer: COMMERCIAL

## 2023-10-10 PROCEDURE — 78452 HT MUSCLE IMAGE SPECT MULT: CPT

## 2023-10-10 PROCEDURE — 93015 CV STRESS TEST SUPVJ I&R: CPT

## 2023-10-10 PROCEDURE — A9500: CPT

## 2023-11-14 ENCOUNTER — APPOINTMENT (OUTPATIENT)
Dept: CARDIOLOGY | Facility: CLINIC | Age: 63
End: 2023-11-14
Payer: COMMERCIAL

## 2023-11-14 VITALS
TEMPERATURE: 97.6 F | DIASTOLIC BLOOD PRESSURE: 80 MMHG | OXYGEN SATURATION: 98 % | RESPIRATION RATE: 18 BRPM | HEART RATE: 69 BPM | BODY MASS INDEX: 17.75 KG/M2 | SYSTOLIC BLOOD PRESSURE: 130 MMHG | WEIGHT: 110 LBS

## 2023-11-14 PROCEDURE — 99214 OFFICE O/P EST MOD 30 MIN: CPT

## 2023-11-14 RX ORDER — METOPROLOL SUCCINATE 25 MG/1
25 TABLET, EXTENDED RELEASE ORAL DAILY
Qty: 90 | Refills: 1 | Status: ACTIVE | COMMUNITY
Start: 2023-10-06 | End: 1900-01-01

## 2023-11-29 ENCOUNTER — APPOINTMENT (OUTPATIENT)
Dept: CARDIOLOGY | Facility: CLINIC | Age: 63
End: 2023-11-29
Payer: COMMERCIAL

## 2023-11-29 ENCOUNTER — NON-APPOINTMENT (OUTPATIENT)
Age: 63
End: 2023-11-29

## 2023-11-29 VITALS
DIASTOLIC BLOOD PRESSURE: 84 MMHG | BODY MASS INDEX: 18.08 KG/M2 | TEMPERATURE: 97.6 F | HEART RATE: 62 BPM | WEIGHT: 112 LBS | SYSTOLIC BLOOD PRESSURE: 132 MMHG | RESPIRATION RATE: 18 BRPM | OXYGEN SATURATION: 99 %

## 2023-11-29 DIAGNOSIS — R07.9 CHEST PAIN, UNSPECIFIED: ICD-10-CM

## 2023-11-29 DIAGNOSIS — R94.39 ABNORMAL RESULT OF OTHER CARDIOVASCULAR FUNCTION STUDY: ICD-10-CM

## 2023-11-29 PROCEDURE — 93000 ELECTROCARDIOGRAM COMPLETE: CPT

## 2023-11-29 PROCEDURE — 99214 OFFICE O/P EST MOD 30 MIN: CPT

## 2024-01-03 ENCOUNTER — OUTPATIENT (OUTPATIENT)
Dept: OUTPATIENT SERVICES | Facility: HOSPITAL | Age: 64
LOS: 1 days | End: 2024-01-03
Payer: COMMERCIAL

## 2024-01-03 ENCOUNTER — TRANSCRIPTION ENCOUNTER (OUTPATIENT)
Age: 64
End: 2024-01-03

## 2024-01-03 VITALS
HEART RATE: 81 BPM | OXYGEN SATURATION: 100 % | DIASTOLIC BLOOD PRESSURE: 76 MMHG | RESPIRATION RATE: 16 BRPM | SYSTOLIC BLOOD PRESSURE: 140 MMHG

## 2024-01-03 VITALS
HEIGHT: 60 IN | DIASTOLIC BLOOD PRESSURE: 70 MMHG | SYSTOLIC BLOOD PRESSURE: 151 MMHG | RESPIRATION RATE: 16 BRPM | HEART RATE: 87 BPM | TEMPERATURE: 98 F | OXYGEN SATURATION: 100 % | WEIGHT: 113.1 LBS

## 2024-01-03 DIAGNOSIS — Z98.51 TUBAL LIGATION STATUS: Chronic | ICD-10-CM

## 2024-01-03 DIAGNOSIS — Z95.5 PRESENCE OF CORONARY ANGIOPLASTY IMPLANT AND GRAFT: Chronic | ICD-10-CM

## 2024-01-03 DIAGNOSIS — I25.10 ATHEROSCLEROTIC HEART DISEASE OF NATIVE CORONARY ARTERY WITHOUT ANGINA PECTORIS: ICD-10-CM

## 2024-01-03 LAB
ALBUMIN SERPL ELPH-MCNC: 4.2 G/DL — SIGNIFICANT CHANGE UP (ref 3.3–5)
ALBUMIN SERPL ELPH-MCNC: 4.2 G/DL — SIGNIFICANT CHANGE UP (ref 3.3–5)
ALP SERPL-CCNC: 88 U/L — SIGNIFICANT CHANGE UP (ref 40–120)
ALP SERPL-CCNC: 88 U/L — SIGNIFICANT CHANGE UP (ref 40–120)
ALT FLD-CCNC: 19 U/L — SIGNIFICANT CHANGE UP (ref 10–45)
ALT FLD-CCNC: 19 U/L — SIGNIFICANT CHANGE UP (ref 10–45)
ANION GAP SERPL CALC-SCNC: 11 MMOL/L — SIGNIFICANT CHANGE UP (ref 5–17)
ANION GAP SERPL CALC-SCNC: 11 MMOL/L — SIGNIFICANT CHANGE UP (ref 5–17)
AST SERPL-CCNC: 21 U/L — SIGNIFICANT CHANGE UP (ref 10–40)
AST SERPL-CCNC: 21 U/L — SIGNIFICANT CHANGE UP (ref 10–40)
BILIRUB SERPL-MCNC: 1.1 MG/DL — SIGNIFICANT CHANGE UP (ref 0.2–1.2)
BILIRUB SERPL-MCNC: 1.1 MG/DL — SIGNIFICANT CHANGE UP (ref 0.2–1.2)
BUN SERPL-MCNC: 11 MG/DL — SIGNIFICANT CHANGE UP (ref 7–23)
BUN SERPL-MCNC: 11 MG/DL — SIGNIFICANT CHANGE UP (ref 7–23)
CALCIUM SERPL-MCNC: 9.1 MG/DL — SIGNIFICANT CHANGE UP (ref 8.4–10.5)
CALCIUM SERPL-MCNC: 9.1 MG/DL — SIGNIFICANT CHANGE UP (ref 8.4–10.5)
CHLORIDE SERPL-SCNC: 107 MMOL/L — SIGNIFICANT CHANGE UP (ref 96–108)
CHLORIDE SERPL-SCNC: 107 MMOL/L — SIGNIFICANT CHANGE UP (ref 96–108)
CO2 SERPL-SCNC: 24 MMOL/L — SIGNIFICANT CHANGE UP (ref 22–31)
CO2 SERPL-SCNC: 24 MMOL/L — SIGNIFICANT CHANGE UP (ref 22–31)
CREAT SERPL-MCNC: 0.75 MG/DL — SIGNIFICANT CHANGE UP (ref 0.5–1.3)
CREAT SERPL-MCNC: 0.75 MG/DL — SIGNIFICANT CHANGE UP (ref 0.5–1.3)
EGFR: 88 ML/MIN/1.73M2 — SIGNIFICANT CHANGE UP
EGFR: 88 ML/MIN/1.73M2 — SIGNIFICANT CHANGE UP
GLUCOSE SERPL-MCNC: 101 MG/DL — HIGH (ref 70–99)
GLUCOSE SERPL-MCNC: 101 MG/DL — HIGH (ref 70–99)
HCT VFR BLD CALC: 37 % — SIGNIFICANT CHANGE UP (ref 34.5–45)
HCT VFR BLD CALC: 37 % — SIGNIFICANT CHANGE UP (ref 34.5–45)
HGB BLD-MCNC: 12.2 G/DL — SIGNIFICANT CHANGE UP (ref 11.5–15.5)
HGB BLD-MCNC: 12.2 G/DL — SIGNIFICANT CHANGE UP (ref 11.5–15.5)
MCHC RBC-ENTMCNC: 31 PG — SIGNIFICANT CHANGE UP (ref 27–34)
MCHC RBC-ENTMCNC: 31 PG — SIGNIFICANT CHANGE UP (ref 27–34)
MCHC RBC-ENTMCNC: 33 GM/DL — SIGNIFICANT CHANGE UP (ref 32–36)
MCHC RBC-ENTMCNC: 33 GM/DL — SIGNIFICANT CHANGE UP (ref 32–36)
MCV RBC AUTO: 93.9 FL — SIGNIFICANT CHANGE UP (ref 80–100)
MCV RBC AUTO: 93.9 FL — SIGNIFICANT CHANGE UP (ref 80–100)
NRBC # BLD: 0 /100 WBCS — SIGNIFICANT CHANGE UP (ref 0–0)
NRBC # BLD: 0 /100 WBCS — SIGNIFICANT CHANGE UP (ref 0–0)
PLATELET # BLD AUTO: 153 K/UL — SIGNIFICANT CHANGE UP (ref 150–400)
PLATELET # BLD AUTO: 153 K/UL — SIGNIFICANT CHANGE UP (ref 150–400)
POTASSIUM SERPL-MCNC: 3.9 MMOL/L — SIGNIFICANT CHANGE UP (ref 3.5–5.3)
POTASSIUM SERPL-MCNC: 3.9 MMOL/L — SIGNIFICANT CHANGE UP (ref 3.5–5.3)
POTASSIUM SERPL-SCNC: 3.9 MMOL/L — SIGNIFICANT CHANGE UP (ref 3.5–5.3)
POTASSIUM SERPL-SCNC: 3.9 MMOL/L — SIGNIFICANT CHANGE UP (ref 3.5–5.3)
PROT SERPL-MCNC: 6.5 G/DL — SIGNIFICANT CHANGE UP (ref 6–8.3)
PROT SERPL-MCNC: 6.5 G/DL — SIGNIFICANT CHANGE UP (ref 6–8.3)
RBC # BLD: 3.94 M/UL — SIGNIFICANT CHANGE UP (ref 3.8–5.2)
RBC # BLD: 3.94 M/UL — SIGNIFICANT CHANGE UP (ref 3.8–5.2)
RBC # FLD: 11.4 % — SIGNIFICANT CHANGE UP (ref 10.3–14.5)
RBC # FLD: 11.4 % — SIGNIFICANT CHANGE UP (ref 10.3–14.5)
SODIUM SERPL-SCNC: 142 MMOL/L — SIGNIFICANT CHANGE UP (ref 135–145)
SODIUM SERPL-SCNC: 142 MMOL/L — SIGNIFICANT CHANGE UP (ref 135–145)
WBC # BLD: 8.97 K/UL — SIGNIFICANT CHANGE UP (ref 3.8–10.5)
WBC # BLD: 8.97 K/UL — SIGNIFICANT CHANGE UP (ref 3.8–10.5)
WBC # FLD AUTO: 8.97 K/UL — SIGNIFICANT CHANGE UP (ref 3.8–10.5)
WBC # FLD AUTO: 8.97 K/UL — SIGNIFICANT CHANGE UP (ref 3.8–10.5)

## 2024-01-03 PROCEDURE — 99152 MOD SED SAME PHYS/QHP 5/>YRS: CPT

## 2024-01-03 PROCEDURE — 36415 COLL VENOUS BLD VENIPUNCTURE: CPT

## 2024-01-03 PROCEDURE — 93454 CORONARY ARTERY ANGIO S&I: CPT | Mod: 26

## 2024-01-03 PROCEDURE — 93010 ELECTROCARDIOGRAM REPORT: CPT

## 2024-01-03 PROCEDURE — 85027 COMPLETE CBC AUTOMATED: CPT

## 2024-01-03 PROCEDURE — 93005 ELECTROCARDIOGRAM TRACING: CPT

## 2024-01-03 PROCEDURE — C1887: CPT

## 2024-01-03 PROCEDURE — C1894: CPT

## 2024-01-03 PROCEDURE — 93454 CORONARY ARTERY ANGIO S&I: CPT

## 2024-01-03 PROCEDURE — 80053 COMPREHEN METABOLIC PANEL: CPT

## 2024-01-03 PROCEDURE — C1769: CPT

## 2024-01-03 RX ORDER — SODIUM CHLORIDE 9 MG/ML
250 INJECTION INTRAMUSCULAR; INTRAVENOUS; SUBCUTANEOUS ONCE
Refills: 0 | Status: COMPLETED | OUTPATIENT
Start: 2024-01-03 | End: 2024-01-03

## 2024-01-03 RX ORDER — SODIUM CHLORIDE 9 MG/ML
1000 INJECTION INTRAMUSCULAR; INTRAVENOUS; SUBCUTANEOUS
Refills: 0 | Status: DISCONTINUED | OUTPATIENT
Start: 2024-01-03 | End: 2024-01-17

## 2024-01-03 RX ORDER — CLOPIDOGREL BISULFATE 75 MG/1
75 TABLET, FILM COATED ORAL DAILY
Refills: 0 | Status: DISCONTINUED | OUTPATIENT
Start: 2024-01-03 | End: 2024-01-17

## 2024-01-03 RX ORDER — ACETAMINOPHEN 500 MG
650 TABLET ORAL ONCE
Refills: 0 | Status: COMPLETED | OUTPATIENT
Start: 2024-01-03 | End: 2024-01-03

## 2024-01-03 RX ORDER — ATORVASTATIN CALCIUM 80 MG/1
1 TABLET, FILM COATED ORAL
Refills: 0 | DISCHARGE

## 2024-01-03 RX ORDER — METOPROLOL TARTRATE 50 MG
1 TABLET ORAL
Refills: 0 | DISCHARGE

## 2024-01-03 RX ADMIN — SODIUM CHLORIDE 250 MILLILITER(S): 9 INJECTION INTRAMUSCULAR; INTRAVENOUS; SUBCUTANEOUS at 08:04

## 2024-01-03 RX ADMIN — Medication 650 MILLIGRAM(S): at 10:15

## 2024-01-03 RX ADMIN — Medication 650 MILLIGRAM(S): at 09:48

## 2024-01-03 RX ADMIN — SODIUM CHLORIDE 75 MILLILITER(S): 9 INJECTION INTRAMUSCULAR; INTRAVENOUS; SUBCUTANEOUS at 08:04

## 2024-01-03 RX ADMIN — CLOPIDOGREL BISULFATE 75 MILLIGRAM(S): 75 TABLET, FILM COATED ORAL at 08:05

## 2024-01-03 NOTE — ASU DISCHARGE PLAN (ADULT/PEDIATRIC) - CARE PROVIDER_API CALL
Wero Rendon  Cardiology  [address], [address]  [place] [address]  Phone: [phone]  Fax: [phone]  Established Patient  Follow Up Time: 1 month   Wero Rendon  Cardiology  [address]  [place] [address]  Phone: [phone]  Fax: [phone]  Established Patient  Follow Up Time: 1 month

## 2024-01-03 NOTE — H&P CARDIOLOGY - NSICDXPASTSURGICALHX_GEN_ALL_CORE_FT
PAST SURGICAL HISTORY:  No significant past surgical history PAST SURGICAL HISTORY:  H/O tubal ligation     History of coronary artery stent placement

## 2024-01-03 NOTE — ASU DISCHARGE PLAN (ADULT/PEDIATRIC) - NS MD DC FALL RISK RISK
For information on Fall & Injury Prevention, visit: [email or web address] OR  [email or web address] OR  https://www.cdc.gov/steadi/patient.html For information on Fall & Injury Prevention, visit: [email or web address] OR  [email or web address] OR  https://www.cdc.gov/steadi/patient.html

## 2024-01-03 NOTE — H&P CARDIOLOGY - HISTORY OF PRESENT ILLNESS
62 yo  female with PMHX of CAD s/p PCI with CARMELA x1 to mid LAD for 100% occlusion (9/23/2016) with residula LM 30% disease, HTN. Recent NST 10/10/23 showed medium size, mild defects in the mid to distal anteroseptal walls that are partially reversible suggestive of mild ischemia; LVEF 75%. Patient continues to experience chest pain, currently on beta blocker.  64 yo  female with PMHX of CAD s/p PCI with CARMELA x1 to mid LAD for 100% occlusion (9/23/2016) with residula LM 30% disease, HTN. Recent NST 10/10/23 showed medium size, mild defects in the mid to distal anteroseptal walls that are partially reversible suggestive of mild ischemia; LVEF 75%. Patient continues to experience chest pain, currently on beta blocker.     Pt was referred for Cardiac Cath by Dr Ayaz Nuñez.  62 yo  female with PMHX of CAD s/p PCI with CARMELA x1 to mid LAD for 100% occlusion (9/23/2016) with residula LM 30% disease, HTN. Recent NST 10/10/23 showed medium size, mild defects in the mid to distal anteroseptal walls that are partially reversible suggestive of mild ischemia; LVEF 75%. Patient continues to experience chest pain, currently on beta blocker.     Pt was referred for Cardiac Cath by Dr Ayaz Nuñez.  64 yo  female with PMHX of CAD s/p PCI with CARMELA x1 to mid LAD for 100% occlusion (9/23/2016) with residual LM 30% disease, HTN presents with chest pain. Patient continues to experience chest pain, intermittent, nonradiating, worse on exertion lasting for more than an hour. NST 10/10/23 showed medium size, mild defects in the mid to distal anteroseptal walls that are partially reversible suggestive of mild ischemia; LVEF 75%. Pt was referred for Cardiac Cath by Dr Ayaz Nuñez.  62 yo  female with PMHX of CAD s/p PCI with CARMELA x1 to mid LAD for 100% occlusion (9/23/2016) with residual LM 30% disease, HTN presents with chest pain. Patient continues to experience chest pain, intermittent, nonradiating, worse on exertion lasting for more than an hour. NST 10/10/23 showed medium size, mild defects in the mid to distal anteroseptal walls that are partially reversible suggestive of mild ischemia; LVEF 75%. Pt was referred for Cardiac Cath by Dr Ayaz Nuñez.

## 2024-01-03 NOTE — ASU DISCHARGE PLAN (ADULT/PEDIATRIC) - ASU DC SPECIAL INSTRUCTIONSFT
Wound Care:   the day AFTER your procedure remove bandage GENTLY, and clean using  mild soap and gentle warm, water stream, pat dry. leave OPEN to air. YOU MAY SHOWER   DO NOT apply lotions, creams, ointments, powder, perfumes to your incision site  DO NOT SOAK your site for 1 week ( no baths, no pools, no tubs, etc...)  Check  your groin and/or wrist daily. A small amount of bruising, and soreness are normal    ACTIVITY: for 24 hours   - DO NOT DRIVE  - DO NOT make any important decisions or sign legal documents   - DO NOT operate heavy machineries   - you may resume sexual activity in 48 hours, unless otherwise instructed by your cardiologist     If your procedure was done through the WRIST: for the NEXT 3 DAYS  - avoid pushing, pulling, with that affected wrist   - avoid repeated movement of that hand and wrist ( eg: typing, hammering)  - DO NOT LIFT anything more than 5 lbs     If your procedure was done through the GROIN: for the NEXT 5 DAYS  - Limit climbing stairs, DO NOT soak in bathtub or pool  - no strenuous activities, pushing, pulling, straining  - Do not lift anything 10lbs or heavier     MEDICATION:   take your medications as explained ( see discharge paperwork)   If you received a STENT, you will be taking antiplatelet medications to KEEP YOUR STENT OPEN ( eg: Aspirin, Plavix, Brilinta, Effient, etc).  Take as prescribed DO NOT STOP taking them without consulting with your cardiologist first.     Follow heart healthy diet recommended by your doctor, , if you smoke STOP SMOKING ( may call [phone] for center of tobacco control if you need assistance)     CALL your doctor to make appointment in 2 WEEKS     ***CALL YOUR DOCTOR***  if you experience: fever, chills, body aches, or severe pain, swelling, redness, heat or yellow discharge at incision site  If you experience Bleeding or excruciating pain at the procedural site, swelling (golf ball size) at your procedural site  If you experience CHEST PAIN  If you experience extremity numbness, tingling, temperature change (of your procedural site)   If you are unable to reach your doctor, you may contact:   -Cardiology Office at [facility] at [phone] or   - [facility] [phone]  - [facility] [phone] Wound Care:   the day AFTER your procedure remove bandage GENTLY, and clean using  mild soap and gentle warm, water stream, pat dry. leave OPEN to air. YOU MAY SHOWER   DO NOT apply lotions, creams, ointments, powder, perfumes to your incision site  DO NOT SOAK your site for 1 week ( no baths, no pools, no tubs, etc...)  Check  your groin and/or wrist daily. A small amount of bruising, and soreness are normal    ACTIVITY: for 24 hours   - DO NOT DRIVE  - DO NOT make any important decisions or sign legal documents   - DO NOT operate heavy machineries   - you may resume sexual activity in 48 hours, unless otherwise instructed by your cardiologist     If your procedure was done through the WRIST: for the NEXT 3 DAYS  - avoid pushing, pulling, with that affected wrist   - avoid repeated movement of that hand and wrist ( eg: typing, hammering)  - DO NOT LIFT anything more than 5 lbs     If your procedure was done through the GROIN: for the NEXT 5 DAYS  - Limit climbing stairs, DO NOT soak in bathtub or pool  - no strenuous activities, pushing, pulling, straining  - Do not lift anything 10lbs or heavier     MEDICATION:   take your medications as explained ( see discharge paperwork)   If you received a STENT, you will be taking antiplatelet medications to KEEP YOUR STENT OPEN ( eg: Aspirin, Plavix, Brilinta, Effient, etc).  Take as prescribed DO NOT STOP taking them without consulting with your cardiologist first.     Follow heart healthy diet recommended by your doctor, , if you smoke STOP SMOKING ( may call [phone] for center of tobacco control if you need assistance)     CALL your doctor to make appointment in 2 WEEKS     ***CALL YOUR DOCTOR***  if you experience: fever, chills, body aches, or severe pain, swelling, redness, heat or yellow discharge at incision site  If you experience Bleeding or excruciating pain at the procedural site, swelling (golf ball size) at your procedural site  If you experience CHEST PAIN  If you experience extremity numbness, tingling, temperature change (of your procedural site)   If you are unable to reach your doctor, you may contact:   -Cardiology Office at [facility] at [phone] or   - [facility] [phone]  - [facility] [phone]

## 2024-01-03 NOTE — ASU PATIENT PROFILE, ADULT - FALL HARM RISK - UNIVERSAL INTERVENTIONS
Bed in lowest position, wheels locked, appropriate side rails in place/Call bell, personal items and telephone in reach/Instruct patient to call for assistance before getting out of bed or chair/Non-slip footwear when patient is out of bed/[place] to call system/Physically safe environment - no spills, clutter or unnecessary equipment/Purposeful Proactive Rounding/Room/bathroom lighting operational, light cord in reach Bed in lowest position, wheels locked, appropriate side rails in place/Call bell, personal items and telephone in reach/Instruct patient to call for assistance before getting out of bed or chair/Non-slip footwear when patient is out of bed/[place] to call system/Physically safe environment - no spills, clutter or unnecessary equipment/Purposeful Proactive Rounding/Room/bathroom lighting operational, light cord in reach

## 2024-02-15 ENCOUNTER — APPOINTMENT (OUTPATIENT)
Dept: CARDIOLOGY | Facility: CLINIC | Age: 64
End: 2024-02-15
Payer: COMMERCIAL

## 2024-02-15 ENCOUNTER — NON-APPOINTMENT (OUTPATIENT)
Age: 64
End: 2024-02-15

## 2024-02-15 VITALS
SYSTOLIC BLOOD PRESSURE: 137 MMHG | DIASTOLIC BLOOD PRESSURE: 86 MMHG | RESPIRATION RATE: 18 BRPM | TEMPERATURE: 97.6 F | HEART RATE: 70 BPM | OXYGEN SATURATION: 97 % | BODY MASS INDEX: 18.24 KG/M2 | WEIGHT: 113 LBS

## 2024-02-15 DIAGNOSIS — Z86.39 PERSONAL HISTORY OF OTHER ENDOCRINE, NUTRITIONAL AND METABOLIC DISEASE: ICD-10-CM

## 2024-02-15 DIAGNOSIS — I25.10 ATHEROSCLEROTIC HEART DISEASE OF NATIVE CORONARY ARTERY W/OUT ANGINA PECTORIS: ICD-10-CM

## 2024-02-15 DIAGNOSIS — Z86.79 PERSONAL HISTORY OF OTHER DISEASES OF THE CIRCULATORY SYSTEM: ICD-10-CM

## 2024-02-15 DIAGNOSIS — Z98.890 OTHER SPECIFIED POSTPROCEDURAL STATES: ICD-10-CM

## 2024-02-15 PROCEDURE — G2211 COMPLEX E/M VISIT ADD ON: CPT

## 2024-02-15 PROCEDURE — 99214 OFFICE O/P EST MOD 30 MIN: CPT

## 2024-02-15 PROCEDURE — 93000 ELECTROCARDIOGRAM COMPLETE: CPT

## 2024-02-15 NOTE — REASON FOR VISIT
[Coronary Artery Disease] : coronary artery disease [FreeTextEntry1] : 63 year old F with history of CAD c/b 100% mid-LAD occlusion s/p stent 9/23/16, repeat LHC 1/3/24 with patent stent and small D1 , HTN who presents for f/u.  Pt last underwent treadmill stress echo 7/31/19; did 6 min Darrell protocol, did not achieve target HR but had no echo evidence of ischemia at 72% MPHR. The baseline echo showed EF 70% and no significant valvular disease. NST 10/10/23 showed: medium-sized, mild defects in the mid to distal anteroseptal walls that are partially reversible, suggestive of mild ischemia. LHC 1/3/24 showed minor luminal LM, pLAD patent stent, D1 small , LCx normal, mid RCA 30%  She is on Plavix 75mg daily and atorvastatin 80mg daily. She is on metoprolol 25mg daily. She is on Enalapril 40mg daily.

## 2024-02-15 NOTE — HISTORY OF PRESENT ILLNESS
[FreeTextEntry1] : She has been fine since [facility] with Dr. Mendoza. She denies chest pain or SOB. No orthopnea, PND, LE edema, dizziness, palpitations, or LOC. She is getting labs with Dr. Muñoz within the next month.  11/29/23 - Last Wednesday, the chest pain returned while she was at work. It lasted for approximately 30 minutes with some radiation to the jaw. It self-resolved on its own. Currently chest pain free. No SOB, orthopnea, PND, or LE edema.  11/14/23 - She has not had recurrent chest pain since starting metoprolol. She feels well. No SOB, orthopnea, PND, LE edema, palpitations, dizziness, or LOC.  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 Her BP was 183/92 at the time, she sat down and rest and felt better after 15 minutes. She did not go to ED. Since then she has been feeling okay with occasional chest discomfort. No SOB, orthopnea, PND or LE edema.  9/15/23 - She denies any symptoms at this time. She is walking daily as exercise. She has no CP, SOB, orthopnea, PND, LE edema. No issues with bleeding. She denied any issues with bleeding while she was taking ASA and Plavix together before.  Last seen by Dr. Tomlinson 1/18/23: On 9-, the patient has dobutamine stress nuclear test, from which, she was found with large area of anterior wall ischemia. On 9-, she tolerated the PCI for PLAD and MLAD. She is working now without symptoms. Since the completion of the PCI, she has been doing well without chest pain, shortness of breath, dizziness or palpitations. During the COVID pandemic, she is sedentary but without manifestation or event. She has completed 2X Pfizer COVID vaccinations. She suffered from hypertensive reaction, dizziness after the COVID vaccination on 1-2-2021. She has been well with current medications. She has intolerance to aspirin for GI.

## 2024-02-15 NOTE — ASSESSMENT
[FreeTextEntry1] :  63 year old F with history of CAD c/b 100% mid-LAD occlusion s/p stent 9/23/16, repeat LHC 1/3/24 with patent stent and small D1 , HTN who presents for f/u.  She has been fine since LHC with Dr. Mendoza. She denies chest pain or SOB. No orthopnea, PND, LE edema, dizziness, palpitations, or LOC. She is getting labs with Dr. Muñoz within the next month.  1) CAD c/b 100% mid-LAD occlusion s/p stent 9/23/16 - NST 10/10/23 showed: medium-sized, mild defects in the mid to distal anteroseptal walls that are partially reversible, suggestive of mild ischemia. - LHC done for abn nuclear and chest discomfort 1/3/24 showed minor luminal LM, pLAD patent stent, D1 small , LCx normal, mid RCA 30%. EKG today showed sinus rhythm without significant abnormalities. Continue medical management of CAD. - Continue Plavix 75mg daily - Continue Atorvastatin 80mg daily, goal LDL 55 - Continue Toprol 25mg XL daily for anti-anginal effect  2) HTN, HLD - Continue current meds. F/U with Dr. Muñoz for bloodwork.  3) Follow-up, 3-4 months

## 2024-06-06 ENCOUNTER — APPOINTMENT (OUTPATIENT)
Dept: CARDIOLOGY | Facility: CLINIC | Age: 64
End: 2024-06-06

## 2024-10-22 NOTE — PHYSICAL EXAM
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 Etiology may be acute ischemic stroke vs. other (such as toxic effect of carbon monoxide). Mechanism if stroke: unknown but may be ESUS.  MRI head: focus of diffusion restriction as well as T2/FLAIR hyperintense signal within the right lateral splenium of the corpus callosum which may reflect an acute/subacute infarct with associated cytotoxic edema. No associated acute hemorrhage. A chronic lacunar infarct is seen within the left centrum semiovale. Additional chronic lacunar infarcts are seen within the bilateral basal  ganglia and left thalamus.  repeat MR w/wo in 1 month   Recommendations: [] Dysphagia screen at bedside per core measures [] SBP goal - OK for BP <130/80 (normotension) [] Can start aspirin 81mg daily if no medical contraindications [] Atorvastatin 80mg (goal LDL <70) [] Please obtain A1c, recent  [] TTE with bubble study can be done as an outpatient [] ILR placement as an outpatient [] Neurochecks, vitals q4h [] Fall and aspiration precautions [] Stroke education provided [] Smoking cessation education not needed - non-smoker [] PROMPT f/u with Vascular Neurology (Stroke) after discharge from [address], [address], [place] [address], [phone].   NO tenecteplase d/t outside therapeutic window. NO thrombectomy d/t no ELVO.  Patient/plan d/w Stroke fellow, Dr. Elizondo, under the supervision of attending vascular neurologist Dr. Pizarro. Recommendations were relayed directly to the ED provider. Note delayed d/t emergent patient care.  5.6 827 [General Appearance - Well Developed] : well developed [Normal Appearance] : normal appearance [Well Groomed] : well groomed [General Appearance - Well Nourished] : well nourished [No Deformities] : no deformities [General Appearance - In No Acute Distress] : no acute distress [Normal Conjunctiva] : the conjunctiva exhibited no abnormalities [Eyelids - No Xanthelasma] : the eyelids demonstrated no xanthelasmas [Normal Oral Mucosa] : normal oral mucosa [No Oral Pallor] : no oral pallor [No Oral Cyanosis] : no oral cyanosis [Normal Jugular Venous A Waves Present] : normal jugular venous A waves present [Normal Jugular Venous V Waves Present] : normal jugular venous V waves present [No Jugular Venous Schaefer A Waves] : no jugular venous schaefer A waves [Respiration, Rhythm And Depth] : normal respiratory rhythm and effort [Exaggerated Use Of Accessory Muscles For Inspiration] : no accessory muscle use [Auscultation Breath Sounds / Voice Sounds] : lungs were clear to auscultation bilaterally [Chest Palpation] : palpation of the chest revealed no abnormalities [Lungs Percussion] : the lungs were normal to percussion [Heart Rate And Rhythm] : heart rate and rhythm were normal [Heart Sounds] : normal S1 and S2 [Murmurs] : no murmurs present [Arterial Pulses Normal] : the arterial pulses were normal [Edema] : no peripheral edema present [Veins - Varicosity Changes] : no varicosital changes were noted in the lower extremities [Bowel Sounds] : normal bowel sounds [Abdomen Soft] : soft [Abdomen Tenderness] : non-tender [Abdomen Mass (___ Cm)] : no abdominal mass palpated [Abdomen Hernia] : no hernia was discovered [Nail Clubbing] : no clubbing of the fingernails [Cyanosis, Localized] : no localized cyanosis [Petechial Hemorrhages (___cm)] : no petechial hemorrhages [Skin Color & Pigmentation] : normal skin color and pigmentation [Skin Turgor] : normal skin turgor [] : no rash [No Venous Stasis] : no venous stasis [Skin Lesions] : no skin lesions [No Skin Ulcers] : no skin ulcer [No Xanthoma] : no  xanthoma was observed [Oriented To Time, Place, And Person] : oriented to person, place, and time [Affect] : the affect was normal [Mood] : the mood was normal [Memory Recent] : recent memory was not impaired [Memory Remote] : remote memory was not impaired [No Anxiety] : not feeling anxious [Abnormal Walk] : normal gait [Gait - Sufficient For Exercise Testing] : the gait was sufficient for exercise testing

## 2025-08-25 ENCOUNTER — APPOINTMENT (OUTPATIENT)
Dept: CARDIOLOGY | Facility: CLINIC | Age: 65
End: 2025-08-25
Payer: COMMERCIAL

## 2025-08-25 VITALS
OXYGEN SATURATION: 98 % | BODY MASS INDEX: 17.11 KG/M2 | HEART RATE: 67 BPM | SYSTOLIC BLOOD PRESSURE: 110 MMHG | RESPIRATION RATE: 18 BRPM | DIASTOLIC BLOOD PRESSURE: 71 MMHG | WEIGHT: 106 LBS

## 2025-08-25 DIAGNOSIS — Z86.79 PERSONAL HISTORY OF OTHER DISEASES OF THE CIRCULATORY SYSTEM: ICD-10-CM

## 2025-08-25 DIAGNOSIS — Z86.39 PERSONAL HISTORY OF OTHER ENDOCRINE, NUTRITIONAL AND METABOLIC DISEASE: ICD-10-CM

## 2025-08-25 DIAGNOSIS — I25.10 ATHEROSCLEROTIC HEART DISEASE OF NATIVE CORONARY ARTERY W/OUT ANGINA PECTORIS: ICD-10-CM

## 2025-08-25 PROCEDURE — 99214 OFFICE O/P EST MOD 30 MIN: CPT

## 2025-08-25 PROCEDURE — 93000 ELECTROCARDIOGRAM COMPLETE: CPT

## 2025-08-25 PROCEDURE — G2211 COMPLEX E/M VISIT ADD ON: CPT
